# Patient Record
Sex: MALE | Race: WHITE | Employment: OTHER | URBAN - NONMETROPOLITAN AREA
[De-identification: names, ages, dates, MRNs, and addresses within clinical notes are randomized per-mention and may not be internally consistent; named-entity substitution may affect disease eponyms.]

---

## 2017-01-17 PROBLEM — M25.552 LEFT HIP PAIN: Status: ACTIVE | Noted: 2017-01-17

## 2017-01-17 PROBLEM — S72.002A CLOSED LEFT HIP FRACTURE (HCC): Status: ACTIVE | Noted: 2017-01-17

## 2017-01-17 PROBLEM — E03.9 HYPOTHYROID: Status: ACTIVE | Noted: 2017-01-17

## 2017-01-17 PROBLEM — Z01.818 PREOPERATIVE CLEARANCE: Status: ACTIVE | Noted: 2017-01-17

## 2017-01-17 PROBLEM — L40.50 PSORIATIC ARTHRITIS (HCC): Status: ACTIVE | Noted: 2017-01-17

## 2017-01-20 ENCOUNTER — TELEPHONE (OUTPATIENT)
Dept: UROLOGY | Age: 72
End: 2017-01-20

## 2017-03-07 ENCOUNTER — OFFICE VISIT (OUTPATIENT)
Dept: UROLOGY | Age: 72
End: 2017-03-07

## 2017-03-07 VITALS — WEIGHT: 277 LBS | HEIGHT: 76 IN | BODY MASS INDEX: 33.73 KG/M2

## 2017-03-07 DIAGNOSIS — N40.0 BENIGN NON-NODULAR PROSTATIC HYPERPLASIA WITHOUT LOWER URINARY TRACT SYMPTOMS: Primary | ICD-10-CM

## 2017-03-07 DIAGNOSIS — R97.20 ELEVATED PSA: ICD-10-CM

## 2017-03-07 LAB
BILIRUBIN URINE: NEGATIVE
BLOOD URINE, POC: NEGATIVE
CHARACTER, URINE: CLEAR
COLOR, URINE: YELLOW
GLUCOSE URINE: NEGATIVE MG/DL
KETONES, URINE: NEGATIVE
LEUKOCYTE CLUMPS, URINE: NEGATIVE
NITRITE, URINE: NEGATIVE
PH, URINE: 5.5
PROTEIN, URINE: NEGATIVE MG/DL
SPECIFIC GRAVITY, URINE: 1.02 (ref 1–1.03)
UROBILINOGEN, URINE: 0.2 EU/DL

## 2017-03-07 PROCEDURE — 3017F COLORECTAL CA SCREEN DOC REV: CPT | Performed by: UROLOGY

## 2017-03-07 PROCEDURE — 99214 OFFICE O/P EST MOD 30 MIN: CPT | Performed by: UROLOGY

## 2017-03-07 PROCEDURE — 1036F TOBACCO NON-USER: CPT | Performed by: UROLOGY

## 2017-03-07 PROCEDURE — G8427 DOCREV CUR MEDS BY ELIG CLIN: HCPCS | Performed by: UROLOGY

## 2017-03-07 PROCEDURE — 1123F ACP DISCUSS/DSCN MKR DOCD: CPT | Performed by: UROLOGY

## 2017-03-07 PROCEDURE — G8417 CALC BMI ABV UP PARAM F/U: HCPCS | Performed by: UROLOGY

## 2017-03-07 PROCEDURE — 4040F PNEUMOC VAC/ADMIN/RCVD: CPT | Performed by: UROLOGY

## 2017-03-07 PROCEDURE — G8484 FLU IMMUNIZE NO ADMIN: HCPCS | Performed by: UROLOGY

## 2017-03-07 PROCEDURE — 81003 URINALYSIS AUTO W/O SCOPE: CPT | Performed by: UROLOGY

## 2017-03-07 ASSESSMENT — ENCOUNTER SYMPTOMS
EYE PAIN: 0
COLOR CHANGE: 0
BACK PAIN: 0
FACIAL SWELLING: 0
VOMITING: 0
CHEST TIGHTNESS: 0
SHORTNESS OF BREATH: 0
CONSTIPATION: 0
EYE REDNESS: 0

## 2017-04-13 DIAGNOSIS — R97.20 ELEVATED PSA: Primary | ICD-10-CM

## 2017-05-03 ENCOUNTER — OFFICE VISIT (OUTPATIENT)
Dept: CARDIOLOGY | Age: 72
End: 2017-05-03

## 2017-05-03 VITALS
WEIGHT: 282 LBS | HEART RATE: 76 BPM | SYSTOLIC BLOOD PRESSURE: 172 MMHG | HEIGHT: 74 IN | DIASTOLIC BLOOD PRESSURE: 72 MMHG | BODY MASS INDEX: 36.19 KG/M2

## 2017-05-03 DIAGNOSIS — R94.31 EKG ABNORMALITIES: ICD-10-CM

## 2017-05-03 DIAGNOSIS — I10 ESSENTIAL (PRIMARY) HYPERTENSION: Primary | ICD-10-CM

## 2017-05-03 PROCEDURE — G8427 DOCREV CUR MEDS BY ELIG CLIN: HCPCS | Performed by: INTERNAL MEDICINE

## 2017-05-03 PROCEDURE — 4040F PNEUMOC VAC/ADMIN/RCVD: CPT | Performed by: INTERNAL MEDICINE

## 2017-05-03 PROCEDURE — 3017F COLORECTAL CA SCREEN DOC REV: CPT | Performed by: INTERNAL MEDICINE

## 2017-05-03 PROCEDURE — G8417 CALC BMI ABV UP PARAM F/U: HCPCS | Performed by: INTERNAL MEDICINE

## 2017-05-03 PROCEDURE — 99203 OFFICE O/P NEW LOW 30 MIN: CPT | Performed by: INTERNAL MEDICINE

## 2017-05-03 PROCEDURE — 1123F ACP DISCUSS/DSCN MKR DOCD: CPT | Performed by: INTERNAL MEDICINE

## 2017-05-03 PROCEDURE — 1036F TOBACCO NON-USER: CPT | Performed by: INTERNAL MEDICINE

## 2017-05-18 ENCOUNTER — TELEPHONE (OUTPATIENT)
Dept: CARDIOLOGY | Age: 72
End: 2017-05-18

## 2017-05-24 ENCOUNTER — OFFICE VISIT (OUTPATIENT)
Dept: CARDIOLOGY | Age: 72
End: 2017-05-24

## 2017-05-24 VITALS
HEART RATE: 84 BPM | HEIGHT: 74 IN | BODY MASS INDEX: 36.19 KG/M2 | DIASTOLIC BLOOD PRESSURE: 80 MMHG | WEIGHT: 282 LBS | SYSTOLIC BLOOD PRESSURE: 148 MMHG

## 2017-05-24 DIAGNOSIS — R94.39 ABNORMAL STRESS ECG: ICD-10-CM

## 2017-05-24 DIAGNOSIS — I20.9 ANGINA, CLASS II (HCC): Primary | ICD-10-CM

## 2017-05-24 PROCEDURE — 1123F ACP DISCUSS/DSCN MKR DOCD: CPT | Performed by: INTERNAL MEDICINE

## 2017-05-24 PROCEDURE — 1036F TOBACCO NON-USER: CPT | Performed by: INTERNAL MEDICINE

## 2017-05-24 PROCEDURE — G8427 DOCREV CUR MEDS BY ELIG CLIN: HCPCS | Performed by: INTERNAL MEDICINE

## 2017-05-24 PROCEDURE — G8598 ASA/ANTIPLAT THER USED: HCPCS | Performed by: INTERNAL MEDICINE

## 2017-05-24 PROCEDURE — 99213 OFFICE O/P EST LOW 20 MIN: CPT | Performed by: INTERNAL MEDICINE

## 2017-05-24 PROCEDURE — 3017F COLORECTAL CA SCREEN DOC REV: CPT | Performed by: INTERNAL MEDICINE

## 2017-05-24 PROCEDURE — G8417 CALC BMI ABV UP PARAM F/U: HCPCS | Performed by: INTERNAL MEDICINE

## 2017-05-24 PROCEDURE — 4040F PNEUMOC VAC/ADMIN/RCVD: CPT | Performed by: INTERNAL MEDICINE

## 2017-06-13 ENCOUNTER — TELEPHONE (OUTPATIENT)
Dept: CARDIOLOGY | Age: 72
End: 2017-06-13

## 2017-06-13 DIAGNOSIS — R94.31 EKG ABNORMALITIES: ICD-10-CM

## 2017-06-13 DIAGNOSIS — I20.9 ANGINA, CLASS III (HCC): Primary | ICD-10-CM

## 2017-06-13 DIAGNOSIS — R97.20 ELEVATED PSA: ICD-10-CM

## 2017-06-13 DIAGNOSIS — I10 ESSENTIAL (PRIMARY) HYPERTENSION: ICD-10-CM

## 2017-06-13 DIAGNOSIS — R94.39 ABNORMAL STRESS ECG: ICD-10-CM

## 2017-07-12 ENCOUNTER — OFFICE VISIT (OUTPATIENT)
Dept: CARDIOLOGY | Age: 72
End: 2017-07-12

## 2017-07-12 VITALS — BODY MASS INDEX: 35.95 KG/M2 | WEIGHT: 280 LBS | SYSTOLIC BLOOD PRESSURE: 138 MMHG | DIASTOLIC BLOOD PRESSURE: 78 MMHG

## 2017-07-12 DIAGNOSIS — I10 ESSENTIAL (PRIMARY) HYPERTENSION: ICD-10-CM

## 2017-07-12 DIAGNOSIS — Z95.820 S/P ANGIOPLASTY WITH STENT: ICD-10-CM

## 2017-07-12 DIAGNOSIS — I25.10 CORONARY ARTERY DISEASE INVOLVING NATIVE CORONARY ARTERY OF NATIVE HEART WITHOUT ANGINA PECTORIS: Primary | ICD-10-CM

## 2017-07-12 PROCEDURE — G8417 CALC BMI ABV UP PARAM F/U: HCPCS | Performed by: PHYSICIAN ASSISTANT

## 2017-07-12 PROCEDURE — 1036F TOBACCO NON-USER: CPT | Performed by: PHYSICIAN ASSISTANT

## 2017-07-12 PROCEDURE — G8598 ASA/ANTIPLAT THER USED: HCPCS | Performed by: PHYSICIAN ASSISTANT

## 2017-07-12 PROCEDURE — G8428 CUR MEDS NOT DOCUMENT: HCPCS | Performed by: PHYSICIAN ASSISTANT

## 2017-07-12 PROCEDURE — 1123F ACP DISCUSS/DSCN MKR DOCD: CPT | Performed by: PHYSICIAN ASSISTANT

## 2017-07-12 PROCEDURE — 99213 OFFICE O/P EST LOW 20 MIN: CPT | Performed by: PHYSICIAN ASSISTANT

## 2017-07-12 PROCEDURE — 3017F COLORECTAL CA SCREEN DOC REV: CPT | Performed by: PHYSICIAN ASSISTANT

## 2017-07-12 PROCEDURE — 93000 ELECTROCARDIOGRAM COMPLETE: CPT | Performed by: PHYSICIAN ASSISTANT

## 2017-07-12 PROCEDURE — 4040F PNEUMOC VAC/ADMIN/RCVD: CPT | Performed by: PHYSICIAN ASSISTANT

## 2017-07-12 RX ORDER — ASPIRIN 81 MG/1
81 TABLET ORAL DAILY
Qty: 90 TABLET | Refills: 0 | Status: SHIPPED | OUTPATIENT
Start: 2017-07-12 | End: 2017-12-27 | Stop reason: SDUPTHER

## 2017-07-12 RX ORDER — ATORVASTATIN CALCIUM 40 MG/1
40 TABLET, FILM COATED ORAL NIGHTLY
Qty: 90 TABLET | Refills: 0 | Status: SHIPPED | OUTPATIENT
Start: 2017-07-12 | End: 2017-11-06 | Stop reason: SDUPTHER

## 2017-07-17 ENCOUNTER — HOSPITAL ENCOUNTER (OUTPATIENT)
Age: 72
Discharge: HOME | End: 2017-07-17
Payer: COMMERCIAL

## 2017-07-17 LAB
ALBUMIN SERPL-MCNC: 3.5 G/DL (ref 3.5–5.1)
ALT SERPL-CCNC: 17 U/L (ref 11–66)
ANISOCYTOSIS: ABNORMAL
BASOPHILS # BLD: 0.8 %
BASOPHILS ABSOLUTE: 0.1 THOU/MM3 (ref 0–0.1)
CREAT SERPL-MCNC: 0.9 MG/DL (ref 0.4–1.2)
EOSINOPHIL # BLD: 3.9 %
EOSINOPHILS ABSOLUTE: 0.4 THOU/MM3 (ref 0–0.4)
GFR SERPL CREATININE-BSD FRML MDRD: 83 ML/MIN/1.73M2
HCT VFR BLD CALC: 42.8 % (ref 42–52)
HEMOGLOBIN: 13.6 GM/DL (ref 14–18)
LYMPHOCYTES # BLD: 16.5 %
LYMPHOCYTES ABSOLUTE: 1.7 THOU/MM3 (ref 1–4.8)
MCH RBC QN AUTO: 28.4 PG (ref 27–31)
MCHC RBC AUTO-ENTMCNC: 31.8 GM/DL (ref 33–37)
MCV RBC AUTO: 89.5 FL (ref 80–94)
MONOCYTES # BLD: 9.9 %
MONOCYTES ABSOLUTE: 1 THOU/MM3 (ref 0.4–1.3)
NUCLEATED RED BLOOD CELLS: 0 /100 WBC
PDW BLD-RTO: 18.6 % (ref 11.5–14.5)
PLATELET # BLD: 253 THOU/MM3 (ref 130–400)
PMV BLD AUTO: 7.9 MCM (ref 7.4–10.4)
RBC # BLD: 4.79 MILL/MM3 (ref 4.7–6.1)
RBC # BLD: NORMAL 10*6/UL
SEDIMENTATION RATE, ERYTHROCYTE: 21 MM/HR (ref 0–10)
SEG NEUTROPHILS: 68.9 %
SEGMENTED NEUTROPHILS ABSOLUTE COUNT: 7 THOU/MM3 (ref 1.8–7.7)
T4 FREE: 1.03 NG/DL (ref 0.93–1.76)
TSH SERPL DL<=0.05 MIU/L-ACNC: 3.62 UIU/ML (ref 0.4–4.2)
WBC # BLD: 10.1 THOU/MM3 (ref 4.8–10.8)

## 2017-07-17 PROCEDURE — 84460 ALANINE AMINO (ALT) (SGPT): CPT

## 2017-07-17 PROCEDURE — 82565 ASSAY OF CREATININE: CPT

## 2017-07-17 PROCEDURE — 36415 COLL VENOUS BLD VENIPUNCTURE: CPT

## 2017-07-17 PROCEDURE — 84439 ASSAY OF FREE THYROXINE: CPT

## 2017-07-17 PROCEDURE — 85651 RBC SED RATE NONAUTOMATED: CPT

## 2017-07-17 PROCEDURE — 84153 ASSAY OF PSA TOTAL: CPT

## 2017-07-17 PROCEDURE — 84443 ASSAY THYROID STIM HORMONE: CPT

## 2017-07-17 PROCEDURE — 82040 ASSAY OF SERUM ALBUMIN: CPT

## 2017-07-17 PROCEDURE — 85025 COMPLETE CBC W/AUTO DIFF WBC: CPT

## 2017-07-18 LAB — PROSTATE SPECIFIC ANTIGEN: 3.77 NG/ML (ref 0–1)

## 2017-09-02 ENCOUNTER — HOSPITAL ENCOUNTER (OUTPATIENT)
Age: 72
Discharge: HOME | End: 2017-09-02
Payer: COMMERCIAL

## 2017-09-02 LAB
ALBUMIN SERPL-MCNC: 3.7 G/DL (ref 3.5–5.1)
ALT SERPL-CCNC: 30 U/L (ref 11–66)
ANISOCYTOSIS: ABNORMAL
BASOPHILS # BLD: 0.9 %
BASOPHILS ABSOLUTE: 0.1 THOU/MM3 (ref 0–0.1)
CREAT SERPL-MCNC: 1 MG/DL (ref 0.4–1.2)
EOSINOPHIL # BLD: 4.1 %
EOSINOPHILS ABSOLUTE: 0.5 THOU/MM3 (ref 0–0.4)
GFR SERPL CREATININE-BSD FRML MDRD: 74 ML/MIN/1.73M2
HCT VFR BLD CALC: 43.1 % (ref 42–52)
HEMOGLOBIN: 13.9 GM/DL (ref 14–18)
LYMPHOCYTES # BLD: 17.6 %
LYMPHOCYTES ABSOLUTE: 2 THOU/MM3 (ref 1–4.8)
MCH RBC QN AUTO: 28.7 PG (ref 27–31)
MCHC RBC AUTO-ENTMCNC: 32.3 GM/DL (ref 33–37)
MCV RBC AUTO: 89 FL (ref 80–94)
MONOCYTES # BLD: 8 %
MONOCYTES ABSOLUTE: 0.9 THOU/MM3 (ref 0.4–1.3)
NUCLEATED RED BLOOD CELLS: 0 /100 WBC
PDW BLD-RTO: 18.7 % (ref 11.5–14.5)
PLATELET # BLD: 274 THOU/MM3 (ref 130–400)
PMV BLD AUTO: 7.6 MCM (ref 7.4–10.4)
RBC # BLD: 4.85 MILL/MM3 (ref 4.7–6.1)
RBC # BLD: NORMAL 10*6/UL
SEDIMENTATION RATE, ERYTHROCYTE: 14 MM/HR (ref 0–10)
SEG NEUTROPHILS: 69.4 %
SEGMENTED NEUTROPHILS ABSOLUTE COUNT: 7.9 THOU/MM3 (ref 1.8–7.7)
WBC # BLD: 11.4 THOU/MM3 (ref 4.8–10.8)

## 2017-09-02 PROCEDURE — 84460 ALANINE AMINO (ALT) (SGPT): CPT

## 2017-09-02 PROCEDURE — 82040 ASSAY OF SERUM ALBUMIN: CPT

## 2017-09-02 PROCEDURE — 82565 ASSAY OF CREATININE: CPT

## 2017-09-02 PROCEDURE — 85651 RBC SED RATE NONAUTOMATED: CPT

## 2017-09-02 PROCEDURE — 85025 COMPLETE CBC W/AUTO DIFF WBC: CPT

## 2017-09-02 PROCEDURE — 36415 COLL VENOUS BLD VENIPUNCTURE: CPT

## 2017-09-06 ENCOUNTER — TELEPHONE (OUTPATIENT)
Dept: UROLOGY | Age: 72
End: 2017-09-06

## 2017-09-20 ENCOUNTER — HOSPITAL ENCOUNTER (OUTPATIENT)
Age: 72
Discharge: HOME | End: 2017-09-20
Payer: COMMERCIAL

## 2017-09-20 DIAGNOSIS — R97.20 ELEVATED PSA: ICD-10-CM

## 2017-09-20 PROCEDURE — 84153 ASSAY OF PSA TOTAL: CPT

## 2017-09-20 PROCEDURE — 36415 COLL VENOUS BLD VENIPUNCTURE: CPT

## 2017-09-21 ENCOUNTER — TELEPHONE (OUTPATIENT)
Dept: UROLOGY | Age: 72
End: 2017-09-21

## 2017-09-21 LAB — PROSTATE SPECIFIC ANTIGEN: 4.26 NG/ML (ref 0–1)

## 2017-10-16 ENCOUNTER — TELEPHONE (OUTPATIENT)
Dept: CARDIOLOGY CLINIC | Age: 72
End: 2017-10-16

## 2017-10-30 ENCOUNTER — HOSPITAL ENCOUNTER (EMERGENCY)
Age: 72
Discharge: HOME | End: 2017-10-30
Attending: EMERGENCY MEDICINE
Payer: COMMERCIAL

## 2017-10-30 VITALS
DIASTOLIC BLOOD PRESSURE: 72 MMHG | TEMPERATURE: 97.2 F | OXYGEN SATURATION: 98 % | WEIGHT: 280 LBS | HEIGHT: 74 IN | HEART RATE: 76 BPM | SYSTOLIC BLOOD PRESSURE: 144 MMHG | BODY MASS INDEX: 35.94 KG/M2 | RESPIRATION RATE: 16 BRPM

## 2017-10-30 DIAGNOSIS — L03.032 CELLULITIS OF GREAT TOE OF LEFT FOOT: Primary | ICD-10-CM

## 2017-10-30 PROCEDURE — 6360000002 HC RX W HCPCS

## 2017-10-30 PROCEDURE — 2580000003 HC RX 258

## 2017-10-30 PROCEDURE — 99283 EMERGENCY DEPT VISIT LOW MDM: CPT

## 2017-10-30 PROCEDURE — 96372 THER/PROPH/DIAG INJ SC/IM: CPT

## 2017-10-30 PROCEDURE — 6370000000 HC RX 637 (ALT 250 FOR IP): Performed by: EMERGENCY MEDICINE

## 2017-10-30 RX ORDER — CEFTRIAXONE 2 G/1
INJECTION, POWDER, FOR SOLUTION INTRAMUSCULAR; INTRAVENOUS
Status: COMPLETED
Start: 2017-10-30 | End: 2017-10-30

## 2017-10-30 RX ORDER — DOXYCYCLINE HYCLATE 100 MG
100 TABLET ORAL 2 TIMES DAILY
Qty: 20 TABLET | Refills: 0 | Status: SHIPPED | OUTPATIENT
Start: 2017-10-30 | End: 2017-11-09

## 2017-10-30 RX ADMIN — CEFTRIAXONE 1000 MG: 2 INJECTION, POWDER, FOR SOLUTION INTRAMUSCULAR; INTRAVENOUS at 01:05

## 2017-10-30 RX ADMIN — WATER 2.3 ML: 1 INJECTION INTRAMUSCULAR; INTRAVENOUS; SUBCUTANEOUS at 01:06

## 2017-10-30 ASSESSMENT — PAIN SCALES - GENERAL
PAINLEVEL_OUTOF10: 8
PAINLEVEL_OUTOF10: 8

## 2017-10-30 ASSESSMENT — PAIN DESCRIPTION - DESCRIPTORS: DESCRIPTORS: SHARP

## 2017-10-30 ASSESSMENT — PAIN DESCRIPTION - LOCATION
LOCATION: TOE (COMMENT WHICH ONE)
LOCATION: TOE (COMMENT WHICH ONE)

## 2017-10-30 ASSESSMENT — PAIN DESCRIPTION - ORIENTATION: ORIENTATION: LEFT

## 2017-10-30 NOTE — ED PROVIDER NOTES
[facility]  eMERGENCY dEPARTMENT eNCOUnter          CHIEF COMPLAINT       Chief Complaint   Patient presents with    Toe Pain     left foot       Nurses Notes reviewed and I agree except as noted in the HPI. HISTORY OF PRESENT ILLNESS    Tamra Crooks is a 71 y.o. male who presented via private vehicle with the chief complaint mentioned above. Symptoms started 2 days ago. He denies injury. He described his pain as mild dull persistent pain of the left great toe , which is worse with the walking. He denies fever or chills. REVIEW OF SYSTEMS     No chest pain or shortness of breath. No nausea or vomiting    PAST MEDICAL HISTORY    has a past medical history of Arthritis; BPH (benign prostatic hyperplasia); CAD (coronary artery disease); Colon cancer ([facility] [address] Psoriasis; Thyroid disease; Wears dentures; Wears glasses; and Wears hearing aid. SURGICAL HISTORY      has a past surgical history that includes Colon surgery (1996); other surgical history (2001); back surgery (11/22/2013); Appendectomy; Hip Arthroplasty (Left, 01/17/2017); Coronary angioplasty with stent (06/02/2017); shoulder surgery (Right, 1965); and Cardiac surgery. CURRENT MEDICATIONS       Previous Medications    ASPIRIN 81 MG EC TABLET    Take 1 tablet by mouth daily    ATORVASTATIN (LIPITOR) 40 MG TABLET    Take 1 tablet by mouth nightly    ERGOCALCIFEROL (VITAMIN D2 PO)    Take 50,000 Units by mouth once a week Takes on Thursday    FOLIC ACID (FOLVITE) 1 MG TABLET    Take 1 mg by mouth daily.     LEVOTHYROXINE (SYNTHROID) 25 MCG TABLET    Take by mouth Daily 2 tab    METHOTREXATE (RHEUMATREX) 2.5 MG CHEMO TABLET    Take by mouth once a week 8 tab On mondays    METOPROLOL TARTRATE (LOPRESSOR) 25 MG TABLET    Take 0.5 tablets by mouth 2 times daily    NITROGLYCERIN (NITROSTAT) 0.4 MG SL TABLET    Place 1 tablet under the tongue every 5 minutes as needed for Chest pain (do not take with erectile dysfunctions medications) up to max of 3 total doses. If no relief after 1 dose, call 911. OMEPRAZOLE (PRILOSEC) 40 MG DELAYED RELEASE CAPSULE    Take 40 mg by mouth Every three days    PRASUGREL (EFFIENT) 10 MG TABS    Take 1 tablet by mouth daily    SPIRONOLACTONE (ALDACTONE) 25 MG TABLET    Take 25 mg by mouth every morning       ALLERGIES     is allergic to sulfate. FAMILY HISTORY     indicated that his mother is . He indicated that his father is . He indicated that the status of his neg hx is unknown.    family history includes Heart Disease in his father; Stroke in his mother. SOCIAL HISTORY      reports that he has never smoked. He has never used smokeless tobacco. He reports that he does not drink alcohol or use drugs. PHYSICAL EXAM     INITIAL VITALS:  height is 6' 2\" (1.88 m) and weight is 280 lb (127 kg). His oral temperature is 97.2 °F (36.2 °C). His blood pressure is 140/72 (abnormal) and his pulse is 78. His respiration is 18 and oxygen saturation is 98%. Physical Exam   Constitutional: He appears well-developed and well-nourished. No distress. Musculoskeletal:   Examination of the left foot showed chronic deformity and hammertoe of his left great toe. Left great toe, mildly swollen, red and tender. The MTP joint is not affected. There is no wounds or fluctuance. DIFFERENTIAL DIAGNOSIS:       EMERGENCY DEPARTMENT COURSE:   Vitals:    Vitals:    10/30/17 0042   BP: (!) 140/72   Pulse: 78   Resp: 18   Temp: 97.2 °F (36.2 °C)   TempSrc: Oral   SpO2: 98%   Weight: 280 lb (127 kg)   Height: 6' 2\" (1.88 m)     He received Rocephin 1 g IM. He was given nor Norco starter pack to go    FINAL IMPRESSION      1. Cellulitis of great toe of left foot          DISPOSITION/PLAN   He was discharged home in good condition, he was referred to podiatry, advised to return if worse or new symptoms.     PATIENT REFERRED TO:  Nayana Colindres DPM  [address] [facility]

## 2017-10-30 NOTE — ED TRIAGE NOTES
Pt states starting at apx 2200 tonight pain started in left great toe. Later noticed pain increased with redness and swelling.

## 2017-11-06 RX ORDER — ATORVASTATIN CALCIUM 40 MG/1
TABLET, FILM COATED ORAL
Qty: 90 TABLET | Refills: 3 | Status: SHIPPED | OUTPATIENT
Start: 2017-11-06 | End: 2018-01-01 | Stop reason: SDUPTHER

## 2017-12-04 ENCOUNTER — HOSPITAL ENCOUNTER (OUTPATIENT)
Age: 72
Discharge: HOME | End: 2017-12-04
Payer: COMMERCIAL

## 2017-12-04 LAB
ALBUMIN SERPL-MCNC: 3.6 G/DL (ref 3.5–5.1)
ALT SERPL-CCNC: 22 U/L (ref 11–66)
ANISOCYTOSIS: ABNORMAL
BASOPHILS # BLD: 0.6 %
BASOPHILS ABSOLUTE: 0.1 THOU/MM3 (ref 0–0.1)
CREAT SERPL-MCNC: 1 MG/DL (ref 0.4–1.2)
EOSINOPHIL # BLD: 4.2 %
EOSINOPHILS ABSOLUTE: 0.4 THOU/MM3 (ref 0–0.4)
GFR SERPL CREATININE-BSD FRML MDRD: 73 ML/MIN/1.73M2
HCT VFR BLD CALC: 43.6 % (ref 42–52)
HEMOGLOBIN: 13.8 GM/DL (ref 14–18)
LYMPHOCYTES # BLD: 19.1 %
LYMPHOCYTES ABSOLUTE: 1.9 THOU/MM3 (ref 1–4.8)
MCH RBC QN AUTO: 27.7 PG (ref 27–31)
MCHC RBC AUTO-ENTMCNC: 31.6 GM/DL (ref 33–37)
MCV RBC AUTO: 87.6 FL (ref 80–94)
MONOCYTES # BLD: 7.5 %
MONOCYTES ABSOLUTE: 0.8 THOU/MM3 (ref 0.4–1.3)
NUCLEATED RED BLOOD CELLS: 0 /100 WBC
PDW BLD-RTO: 17.2 % (ref 11.5–14.5)
PLATELET # BLD: 251 THOU/MM3 (ref 130–400)
PMV BLD AUTO: 8 MCM (ref 7.4–10.4)
RBC # BLD: 4.97 MILL/MM3 (ref 4.7–6.1)
SEDIMENTATION RATE, ERYTHROCYTE: 12 MM/HR (ref 0–10)
SEG NEUTROPHILS: 68.6 %
SEGMENTED NEUTROPHILS ABSOLUTE COUNT: 7 THOU/MM3 (ref 1.8–7.7)
WBC # BLD: 10.2 THOU/MM3 (ref 4.8–10.8)

## 2017-12-04 PROCEDURE — 82040 ASSAY OF SERUM ALBUMIN: CPT

## 2017-12-04 PROCEDURE — 85651 RBC SED RATE NONAUTOMATED: CPT

## 2017-12-04 PROCEDURE — 84460 ALANINE AMINO (ALT) (SGPT): CPT

## 2017-12-04 PROCEDURE — 82565 ASSAY OF CREATININE: CPT

## 2017-12-04 PROCEDURE — 85025 COMPLETE CBC W/AUTO DIFF WBC: CPT

## 2017-12-04 PROCEDURE — 36415 COLL VENOUS BLD VENIPUNCTURE: CPT

## 2017-12-08 ENCOUNTER — TELEPHONE (OUTPATIENT)
Dept: UROLOGY | Age: 72
End: 2017-12-08

## 2017-12-08 NOTE — TELEPHONE ENCOUNTER
Contacted patient regarding the Confirm MDx, he stated he would talk to his PCP Dr. Yue Hodge about this and would give us a call back if he decided he wanted to proceed with it. He stated that it wouldn't be this month when he got back with us.

## 2017-12-27 ENCOUNTER — OFFICE VISIT (OUTPATIENT)
Dept: CARDIOLOGY CLINIC | Age: 72
End: 2017-12-27
Payer: COMMERCIAL

## 2017-12-27 VITALS
BODY MASS INDEX: 37.73 KG/M2 | HEART RATE: 72 BPM | SYSTOLIC BLOOD PRESSURE: 138 MMHG | HEIGHT: 74 IN | WEIGHT: 294 LBS | DIASTOLIC BLOOD PRESSURE: 68 MMHG

## 2017-12-27 DIAGNOSIS — R06.02 SOB (SHORTNESS OF BREATH): ICD-10-CM

## 2017-12-27 DIAGNOSIS — Z98.61 S/P PTCA (PERCUTANEOUS TRANSLUMINAL CORONARY ANGIOPLASTY): Primary | ICD-10-CM

## 2017-12-27 PROCEDURE — 99213 OFFICE O/P EST LOW 20 MIN: CPT | Performed by: INTERNAL MEDICINE

## 2017-12-27 PROCEDURE — 1036F TOBACCO NON-USER: CPT | Performed by: INTERNAL MEDICINE

## 2017-12-27 PROCEDURE — 1123F ACP DISCUSS/DSCN MKR DOCD: CPT | Performed by: INTERNAL MEDICINE

## 2017-12-27 PROCEDURE — G8427 DOCREV CUR MEDS BY ELIG CLIN: HCPCS | Performed by: INTERNAL MEDICINE

## 2017-12-27 PROCEDURE — G8598 ASA/ANTIPLAT THER USED: HCPCS | Performed by: INTERNAL MEDICINE

## 2017-12-27 PROCEDURE — 3017F COLORECTAL CA SCREEN DOC REV: CPT | Performed by: INTERNAL MEDICINE

## 2017-12-27 PROCEDURE — G8417 CALC BMI ABV UP PARAM F/U: HCPCS | Performed by: INTERNAL MEDICINE

## 2017-12-27 PROCEDURE — 4040F PNEUMOC VAC/ADMIN/RCVD: CPT | Performed by: INTERNAL MEDICINE

## 2017-12-27 PROCEDURE — G8484 FLU IMMUNIZE NO ADMIN: HCPCS | Performed by: INTERNAL MEDICINE

## 2017-12-27 RX ORDER — ISOSORBIDE MONONITRATE 30 MG/1
30 TABLET, EXTENDED RELEASE ORAL DAILY
COMMUNITY
End: 2017-12-27 | Stop reason: SDUPTHER

## 2017-12-27 RX ORDER — ASPIRIN 81 MG/1
81 TABLET ORAL DAILY
Qty: 90 TABLET | Refills: 3 | Status: SHIPPED | OUTPATIENT
Start: 2017-12-27 | End: 2019-01-01 | Stop reason: SDUPTHER

## 2017-12-27 RX ORDER — ISOSORBIDE MONONITRATE 30 MG/1
30 TABLET, EXTENDED RELEASE ORAL DAILY
Qty: 30 TABLET | Refills: 5 | Status: SHIPPED | OUTPATIENT
Start: 2017-12-27 | End: 2018-02-21 | Stop reason: SDUPTHER

## 2017-12-27 NOTE — PROGRESS NOTES
Oskar Bynum is a 71 y.o. male is here for    ashd and doing well stents to LAD CXM AND RCA   and has had no chest pains . Intensity of the pain none , provocation of the pain none , what relieves the pain none  where does  the pain migrates to none and no  nausea no fever and chills and WITH  sob with and without activity.  No evidence of swelling in legs no palpitations and no syncopal episodes and no dizziness ,no bleeding ,or urination  Problems ,no double visions ,no speech problems and no bowel problems or diarrhea and tolerating medications     Patient Active Problem List   Diagnosis    BPH (benign prostatic hyperplasia)    Elevated PSA    Prostatitis, chronic    Closed left hip fracture (HCC)    Preoperative clearance    Left hip pain    P[facility]atic arthritis ([facility] [address]    Hypothyroid    Subcapital fracture of femur ([address]    Essential (primary) hypertension    EKG abnormalities    Abnormal stress ECG    [facility] class [facility])    Angina, class II ([address]    S/P PTCA (percutaneous transluminal coronary angioplasty)     Past Medical History:   Diagnosis Date    Arthritis     see dr Evonne Saldana - receives injections    BPH (benign prostatic hyperplasia)     CAD (coronary artery disease)     Colon cancer ([address]     Psoriasis     Thyroid disease     Wears dentures     Wears glasses     Wears hearing aid      Social History   Substance Use Topics    Smoking status: Never Smoker    Smokeless tobacco: Never Used    Alcohol use No     Allergies   Allergen Reactions    Sulfate Rash     Current Outpatient Prescriptions   Medication Sig Dispense Refill    Carboxymethylcellulose Sodium (THERATEARS OP) Apply to eye daily      metoprolol tartrate (LOPRESSOR) 25 MG tablet TAKE ONE-HALF TABLET BY MOUTH TWICE DAILY 90 tablet 3    atorvastatin (LIPITOR) 40 MG tablet TAKE ONE TABLET BY MOUTH NIGHTLY 90 tablet 3    aspirin 81 MG EC tablet Take 1 tablet by mouth daily 90 tablet 0    prasugrel (EFFIENT) 10 MG TABS Take 1 tablet by mouth daily 90 tablet 3    nitroGLYCERIN (NITROSTAT) 0.4 MG SL tablet Place 1 tablet under the tongue every 5 minutes as needed for Chest pain (do not take with erectile dysfunctions medications) up to max of 3 total doses. If no relief after 1 dose, call 911. 25 tablet 3    spironolactone (ALDACTONE) 25 MG tablet Take 25 mg by mouth every morning      Ergocalciferol (VITAMIN D2 PO) Take 50,000 Units by mouth once a week Takes on Thursday      methotrexate (RHEUMATREX) 2.5 MG chemo tablet Take by mouth once a week 8 tab On mondays      omeprazole (PRILOSEC) 40 MG delayed release capsule Take 40 mg by mouth Every three days      folic acid (FOLVITE) 1 MG tablet Take 1 mg by mouth daily.  levothyroxine (SYNTHROID) 25 MCG tablet Take by mouth Daily 2 tab       No current facility-administered medications for this visit. REVIEW OF SYSTEM  Constitutional  symptoms such as fever chills and malaise and weakness  Are negative   HE ENT negative  HEART all negative  LUNGS all negative   ABDOMEN all negative  GENITAL URINARY all within normal limits  NEUROLOGY all negative  NECK all negative   SKIN all negative  MUSCULOSKELETAL negative  HEMATOLOGICAL negative  PSYCHIATRIC  negative    Vitals:    12/27/17 1355   BP: 138/68   Pulse: 72   Weight: 294 lb (133.4 kg)   Height: 6' 2\" (1.88 m)       EXAMINATION   Gen.  Appearance is reflective of nutritional normal nutrition and well-groomed   Appears  stated age    Carotid the amplitude of  carotid artery  And up stroke are present or diminished and bruits are absent   Thyroid palpation appears to be  Normal    JOEY  And evaluated and within normal limits  And size of pupils is normal  HEENT  teeth appears to be symmetrical without poor dentition and gums  and palate appears to be healthy and  head is normal cephalic  Without signs of trauma and eyes are without trauma no conjunctiva and Iids retracting and not retracted ptosis and no ptosis and without  erythematous changes and  Nose is symmetrical  without drainage  and ears are  without tinnitus  and throat is clear   JUGULAR VEINS  are not elevated and tongue is mid line no masses presence and no powers A waves present   LYMPHATICS are without adenopathies at least on exam   CHEST  No biventricular heaves and thrills and no right ventricular heaves and examination without signs of trauma and lesions  HEART not distant and regular rate and rhythm without   gallop signs and and no murmurs and systolic crescendo  Decrescendo  normal s1 and s2 sounds and no s3 and s4 split   Point of maximum intensity of the heartbeat  is at or displaced from the fifth intercostal space  LUNGS no   presence of intercostal retractions and no  use of the accessory muscles with a diaphragmatic movement present and not present pectus and pigeon chest and without wheezes and rhonchi and non diminished in all posterior lungs  and without rales  ABDOMEN abdominal bruit not present  And  No  Presency of  morbid obesity and with no    non distended without organomegaly and no rebound tenderness and bowel sound are present  all quadrants   NEUROLOGY all the cranial nerves are intact and no motor deficits  and no sensory deficits  MUSCULAR SKELETAL without signs of trauma and kyphosis and scoliosis and normal range of motion for all extremities  INTEGUMENTARY without tenting and skin rashes indentation and  dryness  NECK without lymph adenopathy   NEUROPSYCHIATRIC has no anxiety, no mood swings and depression  VASCULAR EXAM for carotid ,radial femoral arteries are  steady  and not diminished   Extremities no evidence of peripheral edema  And pulses are  normal    Assessment and plans     1. S/P PTCA (percutaneous transluminal coronary angioplasty)     2.  SOB (shortness of breath)        patient was advised of the side effects of the medications and watch for any change in medical status if any of those side effects develop

## 2017-12-27 NOTE — PROGRESS NOTES
Pt here for check up     C/O SOB \"after 50 yards\"    C/O left sided \"tingle\" in his chest     C/O \"tender nipples\"    Denies BRIAN or palpitations

## 2018-01-01 ENCOUNTER — HOSPITAL ENCOUNTER (OUTPATIENT)
Age: 73
Discharge: HOME | End: 2018-06-11
Payer: COMMERCIAL

## 2018-01-01 ENCOUNTER — OFFICE VISIT (OUTPATIENT)
Dept: CARDIOLOGY CLINIC | Age: 73
End: 2018-01-01
Payer: COMMERCIAL

## 2018-01-01 ENCOUNTER — TELEPHONE (OUTPATIENT)
Dept: UROLOGY | Age: 73
End: 2018-01-01

## 2018-01-01 ENCOUNTER — HOSPITAL ENCOUNTER (OUTPATIENT)
Age: 73
Discharge: HOME | End: 2018-08-16
Payer: COMMERCIAL

## 2018-01-01 VITALS
HEART RATE: 76 BPM | HEIGHT: 75 IN | BODY MASS INDEX: 36.8 KG/M2 | WEIGHT: 296 LBS | SYSTOLIC BLOOD PRESSURE: 128 MMHG | DIASTOLIC BLOOD PRESSURE: 78 MMHG

## 2018-01-01 DIAGNOSIS — I25.10 CORONARY ARTERY DISEASE DUE TO LIPID RICH PLAQUE: Primary | ICD-10-CM

## 2018-01-01 DIAGNOSIS — I25.83 CORONARY ARTERY DISEASE DUE TO LIPID RICH PLAQUE: Primary | ICD-10-CM

## 2018-01-01 LAB
ALBUMIN SERPL-MCNC: 3.7 G/DL (ref 3.5–5.1)
ALBUMIN SERPL-MCNC: 3.8 G/DL (ref 3.5–5.1)
ALP BLD-CCNC: 87 U/L (ref 38–126)
ALT SERPL-CCNC: 18 U/L (ref 11–66)
ALT SERPL-CCNC: 19 U/L (ref 11–66)
ANION GAP SERPL CALCULATED.3IONS-SCNC: 12 MEQ/L (ref 8–16)
ANISOCYTOSIS: ABNORMAL
AST SERPL-CCNC: 23 U/L (ref 5–40)
BASOPHILS # BLD: 0.8 %
BASOPHILS # BLD: 0.9 %
BASOPHILS ABSOLUTE: 0.1 THOU/MM3 (ref 0–0.1)
BASOPHILS ABSOLUTE: 0.1 THOU/MM3 (ref 0–0.1)
BILIRUB SERPL-MCNC: 0.6 MG/DL (ref 0.3–1.2)
BILIRUBIN DIRECT: < 0.2 MG/DL (ref 0–0.3)
BUN BLDV-MCNC: 17 MG/DL (ref 7–22)
CALCIUM SERPL-MCNC: 9.1 MG/DL (ref 8.5–10.5)
CHLORIDE BLD-SCNC: 103 MEQ/L (ref 98–111)
CHOLESTEROL, TOTAL: 113 MG/DL (ref 100–199)
CO2: 24 MEQ/L (ref 23–33)
CREAT SERPL-MCNC: 0.9 MG/DL (ref 0.4–1.2)
CREAT SERPL-MCNC: 1 MG/DL (ref 0.4–1.2)
EOSINOPHIL # BLD: 2.9 %
EOSINOPHIL # BLD: 3.5 %
EOSINOPHILS ABSOLUTE: 0.3 THOU/MM3 (ref 0–0.4)
EOSINOPHILS ABSOLUTE: 0.3 THOU/MM3 (ref 0–0.4)
ERYTHROCYTE [DISTWIDTH] IN BLOOD BY AUTOMATED COUNT: 15.8 % (ref 11.5–14.5)
ERYTHROCYTE [DISTWIDTH] IN BLOOD BY AUTOMATED COUNT: 50.7 FL (ref 35–45)
GFR SERPL CREATININE-BSD FRML MDRD: 73 ML/MIN/1.73M2
GFR SERPL CREATININE-BSD FRML MDRD: 83 ML/MIN/1.73M2
GLUCOSE BLD-MCNC: 96 MG/DL (ref 70–108)
HCT VFR BLD CALC: 45.4 % (ref 42–52)
HCT VFR BLD CALC: 47.4 % (ref 42–52)
HDLC SERPL-MCNC: 25 MG/DL
HEMOGLOBIN: 14.9 GM/DL (ref 14–18)
HEMOGLOBIN: 15 GM/DL (ref 14–18)
IMMATURE GRANS (ABS): 0.03 THOU/MM3 (ref 0–0.07)
IMMATURE GRANULOCYTES: 0.3 %
LDL CHOLESTEROL CALCULATED: 62 MG/DL
LYMPHOCYTES # BLD: 17.9 %
LYMPHOCYTES # BLD: 22.2 %
LYMPHOCYTES ABSOLUTE: 1.8 THOU/MM3 (ref 1–4.8)
LYMPHOCYTES ABSOLUTE: 2 THOU/MM3 (ref 1–4.8)
MCH RBC QN AUTO: 28.4 PG (ref 26–33)
MCH RBC QN AUTO: 28.5 PG (ref 27–31)
MCHC RBC AUTO-ENTMCNC: 31.4 GM/DL (ref 32.2–35.5)
MCHC RBC AUTO-ENTMCNC: 33.1 GM/DL (ref 33–37)
MCV RBC AUTO: 86.2 FL (ref 80–94)
MCV RBC AUTO: 90.3 FL (ref 80–94)
MONOCYTES # BLD: 7.7 %
MONOCYTES # BLD: 9.4 %
MONOCYTES ABSOLUTE: 0.8 THOU/MM3 (ref 0.4–1.3)
MONOCYTES ABSOLUTE: 0.8 THOU/MM3 (ref 0.4–1.3)
NUCLEATED RED BLOOD CELLS: 0 /100 WBC
NUCLEATED RED BLOOD CELLS: 0 /100 WBC
PDW BLD-RTO: 16.2 % (ref 11.5–14.5)
PLATELET # BLD: 218 THOU/MM3 (ref 130–400)
PLATELET # BLD: 243 THOU/MM3 (ref 130–400)
PMV BLD AUTO: 7.8 FL (ref 7.4–10.4)
PMV BLD AUTO: 9.6 FL (ref 9.4–12.4)
POTASSIUM SERPL-SCNC: 4.1 MEQ/L (ref 3.5–5.2)
PROSTATE SPECIFIC ANTIGEN: 5.77 NG/ML (ref 0–1)
RBC # BLD: 5.25 MILL/MM3 (ref 4.7–6.1)
RBC # BLD: 5.27 MILL/MM3 (ref 4.7–6.1)
SEDIMENTATION RATE, ERYTHROCYTE: 13 MM/HR (ref 0–10)
SEDIMENTATION RATE, ERYTHROCYTE: 16 MM/HR (ref 0–10)
SEG NEUTROPHILS: 63.8 %
SEG NEUTROPHILS: 70.6 %
SEGMENTED NEUTROPHILS ABSOLUTE COUNT: 5.6 THOU/MM3 (ref 1.8–7.7)
SEGMENTED NEUTROPHILS ABSOLUTE COUNT: 7.1 THOU/MM3 (ref 1.8–7.7)
SODIUM BLD-SCNC: 139 MEQ/L (ref 135–145)
T4 FREE: 1.04 NG/DL (ref 0.93–1.76)
TOTAL PROTEIN: 7.2 G/DL (ref 6.1–8)
TRIGL SERPL-MCNC: 130 MG/DL (ref 0–199)
TSH SERPL DL<=0.05 MIU/L-ACNC: 5.33 UIU/ML (ref 0.4–4.2)
WBC # BLD: 10 THOU/MM3 (ref 4.8–10.8)
WBC # BLD: 8.8 THOU/MM3 (ref 4.8–10.8)

## 2018-01-01 PROCEDURE — 85025 COMPLETE CBC W/AUTO DIFF WBC: CPT

## 2018-01-01 PROCEDURE — G8598 ASA/ANTIPLAT THER USED: HCPCS | Performed by: INTERNAL MEDICINE

## 2018-01-01 PROCEDURE — 84153 ASSAY OF PSA TOTAL: CPT

## 2018-01-01 PROCEDURE — 80061 LIPID PANEL: CPT

## 2018-01-01 PROCEDURE — 1123F ACP DISCUSS/DSCN MKR DOCD: CPT | Performed by: INTERNAL MEDICINE

## 2018-01-01 PROCEDURE — 99213 OFFICE O/P EST LOW 20 MIN: CPT | Performed by: INTERNAL MEDICINE

## 2018-01-01 PROCEDURE — 3017F COLORECTAL CA SCREEN DOC REV: CPT | Performed by: INTERNAL MEDICINE

## 2018-01-01 PROCEDURE — G8427 DOCREV CUR MEDS BY ELIG CLIN: HCPCS | Performed by: INTERNAL MEDICINE

## 2018-01-01 PROCEDURE — G8417 CALC BMI ABV UP PARAM F/U: HCPCS | Performed by: INTERNAL MEDICINE

## 2018-01-01 PROCEDURE — 84443 ASSAY THYROID STIM HORMONE: CPT

## 2018-01-01 PROCEDURE — 85651 RBC SED RATE NONAUTOMATED: CPT

## 2018-01-01 PROCEDURE — 82565 ASSAY OF CREATININE: CPT

## 2018-01-01 PROCEDURE — 82040 ASSAY OF SERUM ALBUMIN: CPT

## 2018-01-01 PROCEDURE — 84439 ASSAY OF FREE THYROXINE: CPT

## 2018-01-01 PROCEDURE — 80053 COMPREHEN METABOLIC PANEL: CPT

## 2018-01-01 PROCEDURE — 36415 COLL VENOUS BLD VENIPUNCTURE: CPT

## 2018-01-01 PROCEDURE — 4040F PNEUMOC VAC/ADMIN/RCVD: CPT | Performed by: INTERNAL MEDICINE

## 2018-01-01 PROCEDURE — 84460 ALANINE AMINO (ALT) (SGPT): CPT

## 2018-01-01 PROCEDURE — 82248 BILIRUBIN DIRECT: CPT

## 2018-01-01 PROCEDURE — 1036F TOBACCO NON-USER: CPT | Performed by: INTERNAL MEDICINE

## 2018-01-01 RX ORDER — ISOSORBIDE MONONITRATE 60 MG/1
60 TABLET, EXTENDED RELEASE ORAL DAILY
Qty: 30 TABLET | Refills: 0 | Status: SHIPPED | OUTPATIENT
Start: 2018-01-01 | End: 2019-01-01 | Stop reason: SDUPTHER

## 2018-01-01 RX ORDER — PRASUGREL 10 MG/1
10 TABLET, FILM COATED ORAL DAILY
Qty: 90 TABLET | Refills: 1 | Status: SHIPPED | OUTPATIENT
Start: 2018-01-01 | End: 2019-01-01 | Stop reason: SDUPTHER

## 2018-01-01 RX ORDER — ATORVASTATIN CALCIUM 40 MG/1
TABLET, FILM COATED ORAL
Qty: 90 TABLET | Refills: 1 | Status: SHIPPED | OUTPATIENT
Start: 2018-01-01 | End: 2019-01-01 | Stop reason: SDUPTHER

## 2018-01-01 RX ORDER — ISOSORBIDE MONONITRATE 60 MG/1
60 TABLET, EXTENDED RELEASE ORAL DAILY
Qty: 30 TABLET | Refills: 4 | Status: SHIPPED | OUTPATIENT
Start: 2018-01-01 | End: 2018-01-01 | Stop reason: SDUPTHER

## 2018-01-01 ASSESSMENT — ENCOUNTER SYMPTOMS
ORTHOPNEA: 0
DIARRHEA: 0
EYE PAIN: 0
COUGH: 0
SHORTNESS OF BREATH: 0
EYE DISCHARGE: 0
HOARSE VOICE: 0
CONSTIPATION: 0
DOUBLE VISION: 0
HEMOPTYSIS: 0
SPUTUM PRODUCTION: 0
CHANGE IN BOWEL HABIT: 0
BOWEL INCONTINENCE: 0
BLOATING: 0
BLURRED VISION: 0
ABDOMINAL PAIN: 0
BACK PAIN: 0

## 2018-02-21 ENCOUNTER — OFFICE VISIT (OUTPATIENT)
Dept: CARDIOLOGY CLINIC | Age: 73
End: 2018-02-21
Payer: COMMERCIAL

## 2018-02-21 VITALS
WEIGHT: 292 LBS | BODY MASS INDEX: 37.47 KG/M2 | SYSTOLIC BLOOD PRESSURE: 128 MMHG | HEART RATE: 64 BPM | HEIGHT: 74 IN | DIASTOLIC BLOOD PRESSURE: 64 MMHG

## 2018-02-21 DIAGNOSIS — Z98.61 S/P PTCA (PERCUTANEOUS TRANSLUMINAL CORONARY ANGIOPLASTY): Primary | ICD-10-CM

## 2018-02-21 DIAGNOSIS — I10 ESSENTIAL (PRIMARY) HYPERTENSION: ICD-10-CM

## 2018-02-21 DIAGNOSIS — R07.82 INTERCOSTAL PAIN: ICD-10-CM

## 2018-02-21 PROCEDURE — 1123F ACP DISCUSS/DSCN MKR DOCD: CPT | Performed by: INTERNAL MEDICINE

## 2018-02-21 PROCEDURE — G8427 DOCREV CUR MEDS BY ELIG CLIN: HCPCS | Performed by: INTERNAL MEDICINE

## 2018-02-21 PROCEDURE — G8598 ASA/ANTIPLAT THER USED: HCPCS | Performed by: INTERNAL MEDICINE

## 2018-02-21 PROCEDURE — 3017F COLORECTAL CA SCREEN DOC REV: CPT | Performed by: INTERNAL MEDICINE

## 2018-02-21 PROCEDURE — 1036F TOBACCO NON-USER: CPT | Performed by: INTERNAL MEDICINE

## 2018-02-21 PROCEDURE — G8417 CALC BMI ABV UP PARAM F/U: HCPCS | Performed by: INTERNAL MEDICINE

## 2018-02-21 PROCEDURE — 99213 OFFICE O/P EST LOW 20 MIN: CPT | Performed by: INTERNAL MEDICINE

## 2018-02-21 PROCEDURE — G8484 FLU IMMUNIZE NO ADMIN: HCPCS | Performed by: INTERNAL MEDICINE

## 2018-02-21 PROCEDURE — 4040F PNEUMOC VAC/ADMIN/RCVD: CPT | Performed by: INTERNAL MEDICINE

## 2018-02-21 RX ORDER — ISOSORBIDE MONONITRATE 60 MG/1
60 TABLET, EXTENDED RELEASE ORAL DAILY
COMMUNITY
End: 2018-02-21 | Stop reason: SDUPTHER

## 2018-02-21 RX ORDER — NITROGLYCERIN 0.4 MG/1
0.4 TABLET SUBLINGUAL EVERY 5 MIN PRN
Qty: 25 TABLET | Refills: 3 | Status: SHIPPED | OUTPATIENT
Start: 2018-02-21

## 2018-02-21 RX ORDER — ISOSORBIDE MONONITRATE 60 MG/1
60 TABLET, EXTENDED RELEASE ORAL DAILY
Qty: 30 TABLET | Refills: 5 | OUTPATIENT
Start: 2018-02-21 | End: 2018-01-01 | Stop reason: SDUPTHER

## 2018-02-21 RX ORDER — ISOSORBIDE MONONITRATE 30 MG/1
30 TABLET, EXTENDED RELEASE ORAL DAILY
Qty: 30 TABLET | Refills: 5 | Status: SHIPPED | OUTPATIENT
Start: 2018-02-21 | End: 2018-02-21 | Stop reason: DRUGHIGH

## 2018-02-21 NOTE — PROGRESS NOTES
Tin Delgado is a 68 y.o. male is here for    Has lad stent  CXM and RCA by dr Pattie Zepeda  and has had  Some chest pains at night   Intensity of the pain mild , provocation of the pain none , what relieves the pain none  where does  the pain migrates to none  and no nausea no fever and chills and no sob with and without activity.  No evidence of swelling in legs no palpitations and no syncopal episodes and no dizziness ,no bleeding ,or urination  Problems ,no double visions ,no speech problems and no bowel problems or diarrhea and tolerating medications     Patient Active Problem List   Diagnosis    BPH (benign prostatic hyperplasia)    Elevated PSA    Prostatitis, chronic    Closed left hip fracture (HCC)    Preoperative clearance    Left hip pain    [facility]atic arthritis ([facility] [address]    Hypothyroid    Subcapital fracture of femur ([facility] [address]    Essential (primary) hypertension    EKG abnormalities    Abnormal stress ECG    Angina, class III (HCC)    Angina, class II (HCC)    S/P PTCA (percutaneous transluminal coronary angioplasty)    SOB (shortness of breath)     Past Medical History:   Diagnosis Date    Arthritis     see dr Marky Vann - receives injections    BPH (benign prostatic hyperplasia)     CAD (coronary artery disease)     Colon cancer ([facility] [address]     Psoriasis     Thyroid disease     Wears dentures     Wears glasses     Wears hearing aid      Social History   Substance Use Topics    Smoking status: Never Smoker    Smokeless tobacco: Never Used    Alcohol use No     Allergies   Allergen Reactions    Sulfate Rash     Current Outpatient Prescriptions   Medication Sig Dispense Refill    Naproxen Sodium (ALEVE PO) Take by mouth as needed      Carboxymethylcellulose Sodium (THERATEARS OP) Apply to eye daily      aspirin 81 MG EC tablet Take 1 tablet by mouth daily 90 tablet 3    isosorbide mononitrate (IMDUR) 30 MG extended release tablet Take 1 tablet by mouth daily 30 tablet 5    metoprolol coronary angioplasty)     2. Intercostal pain       Nitrol sub prn pains    And imdur to 60 mg day     Nuclear stress test is recommended  to patient to exclude ischemia  Chest pains dr Braeden Hydey to do and patient  agrees with that work up and its risks and benefits and potential need for additional testing if stress test is abnormal such as cardiac catheterization. In addition patient is advised to avoid strenuous activity that not only  Includes all strenuous activities  and is not limited to sexual activities and driving heavy equipments and traveling long distances and hunting or even stressfully events  In the mean time while we a wait stress test testing. Patient was advised to return in 3 months for follow up or sooner if there is any change in care.     We Re assured  And educated the  patient  On their  medical status  And the treatment plans  And the patient  is willing  to be complaint with care  And follow up and  All their question  answered to their  satisfaction    Electronically signed by Amarjit Liu DO on 2/21/2018 at 12:25 PM

## 2018-03-06 ENCOUNTER — HOSPITAL ENCOUNTER (OUTPATIENT)
Dept: NON INVASIVE DIAGNOSTICS | Age: 73
Discharge: HOME | End: 2018-03-06
Payer: COMMERCIAL

## 2018-03-06 VITALS — BODY MASS INDEX: 35.92 KG/M2 | HEIGHT: 76 IN | WEIGHT: 295 LBS

## 2018-03-06 DIAGNOSIS — Z98.61 S/P PTCA (PERCUTANEOUS TRANSLUMINAL CORONARY ANGIOPLASTY): ICD-10-CM

## 2018-03-06 DIAGNOSIS — R07.82 INTERCOSTAL PAIN: ICD-10-CM

## 2018-03-06 DIAGNOSIS — I10 ESSENTIAL (PRIMARY) HYPERTENSION: ICD-10-CM

## 2018-03-06 PROCEDURE — A9500 TC99M SESTAMIBI: HCPCS | Performed by: INTERNAL MEDICINE

## 2018-03-06 PROCEDURE — 93017 CV STRESS TEST TRACING ONLY: CPT | Performed by: INTERNAL MEDICINE

## 2018-03-06 PROCEDURE — 6360000002 HC RX W HCPCS

## 2018-03-06 PROCEDURE — 3430000000 HC RX DIAGNOSTIC RADIOPHARMACEUTICAL: Performed by: INTERNAL MEDICINE

## 2018-03-06 PROCEDURE — 78452 HT MUSCLE IMAGE SPECT MULT: CPT

## 2018-03-06 RX ADMIN — Medication 33.2 MILLICURIE: at 12:55

## 2018-03-06 RX ADMIN — Medication 9 MILLICURIE: at 11:55

## 2018-03-10 ENCOUNTER — HOSPITAL ENCOUNTER (OUTPATIENT)
Age: 73
Discharge: HOME | End: 2018-03-10
Payer: COMMERCIAL

## 2018-03-10 LAB
ALBUMIN SERPL-MCNC: 3.8 G/DL (ref 3.5–5.1)
ALT SERPL-CCNC: 19 U/L (ref 11–66)
ANISOCYTOSIS: ABNORMAL
BASOPHILS # BLD: 0.5 %
BASOPHILS ABSOLUTE: 0 THOU/MM3 (ref 0–0.1)
CREAT SERPL-MCNC: 1 MG/DL (ref 0.4–1.2)
EOSINOPHIL # BLD: 4 %
EOSINOPHILS ABSOLUTE: 0.4 THOU/MM3 (ref 0–0.4)
GFR SERPL CREATININE-BSD FRML MDRD: 73 ML/MIN/1.73M2
HCT VFR BLD CALC: 43.7 % (ref 42–52)
HEMOGLOBIN: 13.9 GM/DL (ref 14–18)
LYMPHOCYTES # BLD: 20.4 %
LYMPHOCYTES ABSOLUTE: 1.8 THOU/MM3 (ref 1–4.8)
MCH RBC QN AUTO: 27.6 PG (ref 27–31)
MCHC RBC AUTO-ENTMCNC: 31.7 GM/DL (ref 33–37)
MCV RBC AUTO: 87 FL (ref 80–94)
MONOCYTES # BLD: 9.9 %
MONOCYTES ABSOLUTE: 0.9 THOU/MM3 (ref 0.4–1.3)
NUCLEATED RED BLOOD CELLS: 0 /100 WBC
PDW BLD-RTO: 17.4 % (ref 11.5–14.5)
PLATELET # BLD: 249 THOU/MM3 (ref 130–400)
PMV BLD AUTO: 7.8 FL (ref 7.4–10.4)
PROSTATE SPECIFIC ANTIGEN: 5.18 NG/ML (ref 0–1)
RBC # BLD: 5.02 MILL/MM3 (ref 4.7–6.1)
SEDIMENTATION RATE, ERYTHROCYTE: 19 MM/HR (ref 0–10)
SEG NEUTROPHILS: 65.2 %
SEGMENTED NEUTROPHILS ABSOLUTE COUNT: 5.9 THOU/MM3 (ref 1.8–7.7)
WBC # BLD: 9 THOU/MM3 (ref 4.8–10.8)

## 2018-03-10 PROCEDURE — 84153 ASSAY OF PSA TOTAL: CPT

## 2018-03-10 PROCEDURE — 82040 ASSAY OF SERUM ALBUMIN: CPT

## 2018-03-10 PROCEDURE — 82565 ASSAY OF CREATININE: CPT

## 2018-03-10 PROCEDURE — 82310 ASSAY OF CALCIUM: CPT

## 2018-03-10 PROCEDURE — 36415 COLL VENOUS BLD VENIPUNCTURE: CPT

## 2018-03-10 PROCEDURE — 85025 COMPLETE CBC W/AUTO DIFF WBC: CPT

## 2018-03-10 PROCEDURE — 85651 RBC SED RATE NONAUTOMATED: CPT

## 2018-03-10 PROCEDURE — 84460 ALANINE AMINO (ALT) (SGPT): CPT

## 2018-03-14 ENCOUNTER — OFFICE VISIT (OUTPATIENT)
Dept: UROLOGY | Age: 73
End: 2018-03-14
Payer: COMMERCIAL

## 2018-03-14 VITALS
BODY MASS INDEX: 35.31 KG/M2 | HEIGHT: 76 IN | DIASTOLIC BLOOD PRESSURE: 68 MMHG | WEIGHT: 290 LBS | SYSTOLIC BLOOD PRESSURE: 118 MMHG

## 2018-03-14 DIAGNOSIS — R97.20 ELEVATED PSA: ICD-10-CM

## 2018-03-14 DIAGNOSIS — C61 PROSTATE CANCER (HCC): ICD-10-CM

## 2018-03-14 DIAGNOSIS — R35.1 NOCTURIA: Primary | ICD-10-CM

## 2018-03-14 DIAGNOSIS — N39.46 MIXED INCONTINENCE: Primary | ICD-10-CM

## 2018-03-14 LAB
BILIRUBIN URINE: ABNORMAL
BLOOD URINE, POC: NEGATIVE
CALCIUM SERPL-MCNC: 9.7 MG/DL (ref 8.5–10.5)
CHARACTER, URINE: CLEAR
COLOR, URINE: YELLOW
GLUCOSE URINE: NEGATIVE MG/DL
KETONES, URINE: ABNORMAL
LEUKOCYTE CLUMPS, URINE: NEGATIVE
NITRITE, URINE: NEGATIVE
PH, URINE: 5.5
POST VOID RESIDUAL (PVR): 0 ML
PROTEIN, URINE: NEGATIVE MG/DL
SPECIFIC GRAVITY, URINE: 1.02 (ref 1–1.03)
UROBILINOGEN, URINE: 0.2 EU/DL

## 2018-03-14 PROCEDURE — 99213 OFFICE O/P EST LOW 20 MIN: CPT | Performed by: NURSE PRACTITIONER

## 2018-03-14 PROCEDURE — G8484 FLU IMMUNIZE NO ADMIN: HCPCS | Performed by: NURSE PRACTITIONER

## 2018-03-14 PROCEDURE — 1036F TOBACCO NON-USER: CPT | Performed by: NURSE PRACTITIONER

## 2018-03-14 PROCEDURE — 51798 US URINE CAPACITY MEASURE: CPT | Performed by: NURSE PRACTITIONER

## 2018-03-14 PROCEDURE — 81003 URINALYSIS AUTO W/O SCOPE: CPT | Performed by: NURSE PRACTITIONER

## 2018-03-14 PROCEDURE — 3017F COLORECTAL CA SCREEN DOC REV: CPT | Performed by: NURSE PRACTITIONER

## 2018-03-14 PROCEDURE — G8427 DOCREV CUR MEDS BY ELIG CLIN: HCPCS | Performed by: NURSE PRACTITIONER

## 2018-03-14 PROCEDURE — G8598 ASA/ANTIPLAT THER USED: HCPCS | Performed by: NURSE PRACTITIONER

## 2018-03-14 PROCEDURE — 1123F ACP DISCUSS/DSCN MKR DOCD: CPT | Performed by: NURSE PRACTITIONER

## 2018-03-14 PROCEDURE — G8417 CALC BMI ABV UP PARAM F/U: HCPCS | Performed by: NURSE PRACTITIONER

## 2018-03-14 PROCEDURE — 4040F PNEUMOC VAC/ADMIN/RCVD: CPT | Performed by: NURSE PRACTITIONER

## 2018-08-13 NOTE — TELEPHONE ENCOUNTER
08/13/2018   Rosi Knapp called requesting a refill on the following medications:  Requested Prescriptions     Pending Prescriptions Disp Refills    prasugrel (EFFIENT) 10 MG TABS 90 tablet 3     Sig: Take 1 tablet by mouth daily     Pharmacy verified:  .pv      Date of last visit: 05/23/2018  Date of next visit (if applicable): [id number]

## 2018-09-27 PROBLEM — Z01.818 PREOPERATIVE CLEARANCE: Status: RESOLVED | Noted: 2017-01-17 | Resolved: 2018-01-01

## 2019-01-01 ENCOUNTER — TELEPHONE (OUTPATIENT)
Dept: UROLOGY | Age: 74
End: 2019-01-01

## 2019-01-01 ENCOUNTER — HOSPITAL ENCOUNTER (OUTPATIENT)
Age: 74
Discharge: HOME | End: 2019-02-06
Payer: MEDICARE

## 2019-01-01 ENCOUNTER — OFFICE VISIT (OUTPATIENT)
Dept: CARDIOLOGY CLINIC | Age: 74
End: 2019-01-01
Payer: COMMERCIAL

## 2019-01-01 ENCOUNTER — OFFICE VISIT (OUTPATIENT)
Dept: UROLOGY | Age: 74
End: 2019-01-01
Payer: MEDICARE

## 2019-01-01 VITALS
BODY MASS INDEX: 36.65 KG/M2 | DIASTOLIC BLOOD PRESSURE: 64 MMHG | SYSTOLIC BLOOD PRESSURE: 128 MMHG | WEIGHT: 301 LBS | HEIGHT: 76 IN

## 2019-01-01 VITALS
HEART RATE: 72 BPM | SYSTOLIC BLOOD PRESSURE: 130 MMHG | BODY MASS INDEX: 37.3 KG/M2 | HEIGHT: 75 IN | DIASTOLIC BLOOD PRESSURE: 62 MMHG | WEIGHT: 300 LBS

## 2019-01-01 DIAGNOSIS — R97.20 ELEVATED PSA: Primary | ICD-10-CM

## 2019-01-01 DIAGNOSIS — N40.1 BPH WITH OBSTRUCTION/LOWER URINARY TRACT SYMPTOMS: ICD-10-CM

## 2019-01-01 DIAGNOSIS — I10 ESSENTIAL (PRIMARY) HYPERTENSION: Primary | ICD-10-CM

## 2019-01-01 DIAGNOSIS — N13.8 BPH WITH OBSTRUCTION/LOWER URINARY TRACT SYMPTOMS: ICD-10-CM

## 2019-01-01 LAB
ALBUMIN SERPL-MCNC: 3.7 G/DL (ref 3.5–5.1)
ALT SERPL-CCNC: 19 U/L (ref 11–66)
BASOPHILS # BLD: 1 %
BASOPHILS ABSOLUTE: 0.1 THOU/MM3 (ref 0–0.1)
BILIRUBIN URINE: ABNORMAL
BLOOD URINE, POC: NEGATIVE
CHARACTER, URINE: CLEAR
COLOR, URINE: YELLOW
CREAT SERPL-MCNC: 1 MG/DL (ref 0.4–1.2)
EOSINOPHIL # BLD: 3.7 %
EOSINOPHILS ABSOLUTE: 0.3 THOU/MM3 (ref 0–0.4)
ERYTHROCYTE [DISTWIDTH] IN BLOOD BY AUTOMATED COUNT: 15.3 % (ref 11.5–14.5)
ERYTHROCYTE [DISTWIDTH] IN BLOOD BY AUTOMATED COUNT: 49.1 FL (ref 35–45)
GFR SERPL CREATININE-BSD FRML MDRD: 73 ML/MIN/1.73M2
GLUCOSE URINE: NEGATIVE MG/DL
HCT VFR BLD CALC: 47.5 % (ref 42–52)
HEMOGLOBIN: 15 GM/DL (ref 14–18)
IMMATURE GRANS (ABS): 0.04 THOU/MM3 (ref 0–0.07)
IMMATURE GRANULOCYTES: 0.4 %
KETONES, URINE: ABNORMAL
LEUKOCYTE CLUMPS, URINE: NEGATIVE
LYMPHOCYTES # BLD: 24.6 %
LYMPHOCYTES ABSOLUTE: 2.3 THOU/MM3 (ref 1–4.8)
MCH RBC QN AUTO: 28.6 PG (ref 26–33)
MCHC RBC AUTO-ENTMCNC: 31.6 GM/DL (ref 32.2–35.5)
MCV RBC AUTO: 90.6 FL (ref 80–94)
MONOCYTES # BLD: 8.9 %
MONOCYTES ABSOLUTE: 0.8 THOU/MM3 (ref 0.4–1.3)
NITRITE, URINE: NEGATIVE
NUCLEATED RED BLOOD CELLS: 0 /100 WBC
PH, URINE: 5.5 (ref 5–9)
PLATELET # BLD: 210 THOU/MM3 (ref 130–400)
PMV BLD AUTO: 9.3 FL (ref 9.4–12.4)
POST VOID RESIDUAL (PVR): 102 ML
PROSTATE SPECIFIC ANTIGEN: 5.65 NG/ML (ref 0–1)
PROTEIN, URINE: 30 MG/DL
RBC # BLD: 5.24 MILL/MM3 (ref 4.7–6.1)
SEDIMENTATION RATE, ERYTHROCYTE: 13 MM/HR (ref 0–10)
SEG NEUTROPHILS: 61.4 %
SEGMENTED NEUTROPHILS ABSOLUTE COUNT: 5.8 THOU/MM3 (ref 1.8–7.7)
SPECIFIC GRAVITY, URINE: >= 1.03 (ref 1–1.03)
TSH SERPL DL<=0.05 MIU/L-ACNC: 5.05 UIU/ML (ref 0.4–4.2)
UROBILINOGEN, URINE: 1 EU/DL (ref 0–1)
VITAMIN D 25-HYDROXY: 33 NG/ML (ref 30–100)
VITAMIN D 25-HYDROXY: 33 NG/ML (ref 30–100)
WBC # BLD: 9.4 THOU/MM3 (ref 4.8–10.8)

## 2019-01-01 PROCEDURE — G8598 ASA/ANTIPLAT THER USED: HCPCS | Performed by: INTERNAL MEDICINE

## 2019-01-01 PROCEDURE — G8484 FLU IMMUNIZE NO ADMIN: HCPCS | Performed by: NURSE PRACTITIONER

## 2019-01-01 PROCEDURE — 1123F ACP DISCUSS/DSCN MKR DOCD: CPT | Performed by: NURSE PRACTITIONER

## 2019-01-01 PROCEDURE — 81003 URINALYSIS AUTO W/O SCOPE: CPT | Performed by: NURSE PRACTITIONER

## 2019-01-01 PROCEDURE — 84443 ASSAY THYROID STIM HORMONE: CPT

## 2019-01-01 PROCEDURE — 1101F PT FALLS ASSESS-DOCD LE1/YR: CPT | Performed by: INTERNAL MEDICINE

## 2019-01-01 PROCEDURE — G8428 CUR MEDS NOT DOCUMENT: HCPCS | Performed by: INTERNAL MEDICINE

## 2019-01-01 PROCEDURE — 82040 ASSAY OF SERUM ALBUMIN: CPT

## 2019-01-01 PROCEDURE — 4040F PNEUMOC VAC/ADMIN/RCVD: CPT | Performed by: INTERNAL MEDICINE

## 2019-01-01 PROCEDURE — 36415 COLL VENOUS BLD VENIPUNCTURE: CPT

## 2019-01-01 PROCEDURE — G8417 CALC BMI ABV UP PARAM F/U: HCPCS | Performed by: NURSE PRACTITIONER

## 2019-01-01 PROCEDURE — 93000 ELECTROCARDIOGRAM COMPLETE: CPT | Performed by: INTERNAL MEDICINE

## 2019-01-01 PROCEDURE — 82565 ASSAY OF CREATININE: CPT

## 2019-01-01 PROCEDURE — 1123F ACP DISCUSS/DSCN MKR DOCD: CPT | Performed by: INTERNAL MEDICINE

## 2019-01-01 PROCEDURE — 1036F TOBACCO NON-USER: CPT | Performed by: NURSE PRACTITIONER

## 2019-01-01 PROCEDURE — 4040F PNEUMOC VAC/ADMIN/RCVD: CPT | Performed by: NURSE PRACTITIONER

## 2019-01-01 PROCEDURE — 84460 ALANINE AMINO (ALT) (SGPT): CPT

## 2019-01-01 PROCEDURE — 3017F COLORECTAL CA SCREEN DOC REV: CPT | Performed by: NURSE PRACTITIONER

## 2019-01-01 PROCEDURE — G8417 CALC BMI ABV UP PARAM F/U: HCPCS | Performed by: INTERNAL MEDICINE

## 2019-01-01 PROCEDURE — G8428 CUR MEDS NOT DOCUMENT: HCPCS | Performed by: NURSE PRACTITIONER

## 2019-01-01 PROCEDURE — 1101F PT FALLS ASSESS-DOCD LE1/YR: CPT | Performed by: NURSE PRACTITIONER

## 2019-01-01 PROCEDURE — 84153 ASSAY OF PSA TOTAL: CPT

## 2019-01-01 PROCEDURE — 51798 US URINE CAPACITY MEASURE: CPT | Performed by: NURSE PRACTITIONER

## 2019-01-01 PROCEDURE — 82306 VITAMIN D 25 HYDROXY: CPT

## 2019-01-01 PROCEDURE — 3017F COLORECTAL CA SCREEN DOC REV: CPT | Performed by: INTERNAL MEDICINE

## 2019-01-01 PROCEDURE — 99213 OFFICE O/P EST LOW 20 MIN: CPT | Performed by: INTERNAL MEDICINE

## 2019-01-01 PROCEDURE — 85025 COMPLETE CBC W/AUTO DIFF WBC: CPT

## 2019-01-01 PROCEDURE — 1036F TOBACCO NON-USER: CPT | Performed by: INTERNAL MEDICINE

## 2019-01-01 PROCEDURE — G8598 ASA/ANTIPLAT THER USED: HCPCS | Performed by: NURSE PRACTITIONER

## 2019-01-01 PROCEDURE — 85651 RBC SED RATE NONAUTOMATED: CPT

## 2019-01-01 PROCEDURE — 99213 OFFICE O/P EST LOW 20 MIN: CPT | Performed by: NURSE PRACTITIONER

## 2019-01-01 PROCEDURE — G8484 FLU IMMUNIZE NO ADMIN: HCPCS | Performed by: INTERNAL MEDICINE

## 2019-01-01 RX ORDER — PRASUGREL 10 MG/1
10 TABLET, FILM COATED ORAL DAILY
Qty: 90 TABLET | Refills: 3 | Status: SHIPPED | OUTPATIENT
Start: 2019-01-01

## 2019-01-01 RX ORDER — ASPIRIN 81 MG/1
81 TABLET ORAL DAILY
Qty: 90 TABLET | Refills: 3 | Status: SHIPPED | OUTPATIENT
Start: 2019-01-01

## 2019-01-01 RX ORDER — ISOSORBIDE MONONITRATE 60 MG/1
60 TABLET, EXTENDED RELEASE ORAL DAILY
Qty: 90 TABLET | Refills: 3 | Status: SHIPPED | OUTPATIENT
Start: 2019-01-01

## 2019-01-01 RX ORDER — ATORVASTATIN CALCIUM 40 MG/1
TABLET, FILM COATED ORAL
Qty: 90 TABLET | Refills: 3 | Status: SHIPPED | OUTPATIENT
Start: 2019-01-01

## 2019-01-01 ASSESSMENT — ENCOUNTER SYMPTOMS
DOUBLE VISION: 0
BLOATING: 0
EYE PAIN: 0
HEMOPTYSIS: 0
BACK PAIN: 0
DIARRHEA: 0
CHANGE IN BOWEL HABIT: 0
HOARSE VOICE: 0
CONSTIPATION: 0
EYE DISCHARGE: 0
COUGH: 0
SPUTUM PRODUCTION: 0
BOWEL INCONTINENCE: 0
BLURRED VISION: 0
ABDOMINAL PAIN: 0
ORTHOPNEA: 0
SHORTNESS OF BREATH: 0